# Patient Record
Sex: FEMALE | Race: WHITE
[De-identification: names, ages, dates, MRNs, and addresses within clinical notes are randomized per-mention and may not be internally consistent; named-entity substitution may affect disease eponyms.]

---

## 2019-02-14 ENCOUNTER — HOSPITAL ENCOUNTER (OUTPATIENT)
Dept: HOSPITAL 11 - JP.SDS | Age: 39
End: 2019-02-14
Attending: SURGERY
Payer: COMMERCIAL

## 2019-02-14 DIAGNOSIS — R59.0: Primary | ICD-10-CM

## 2019-02-14 DIAGNOSIS — J45.909: ICD-10-CM

## 2019-02-14 DIAGNOSIS — E66.9: ICD-10-CM

## 2019-02-14 DIAGNOSIS — Z88.0: ICD-10-CM

## 2019-02-19 NOTE — OR
DATE OF PROCEDURE:  02/14/2019

 

PREOPERATIVE DIAGNOSIS:  Probable lymphadenopathy, right inguinal area.

 

POSTOPERATIVE DIAGNOSIS:  Subfascial mass, right inguinal area.

 

OPERATIVE PROCEDURE:  Excision of subfascial mass, right inguinal area (38010).

 

ANESTHESIA:  Local plus IV sedation.

 

SURGEON:  Paulo Singh MD

 

ASSISTANT:  ABAD Shi.

 

INDICATION FOR PROCEDURE:  This is a 38-year-old status post previous excision of melanoma

from her right lower back, presenting with a mass effect in the right inguinal area.  This

clinically appears to be most likely lymph node.  It is somewhat painful and obviously

suspicious for potential site of metastatic disease in that area.  The size of the lesion is

roughly the size of a peanut and, given this, a direct excisional biopsy appears to be most

appropriate.  Should this come back as a metastatic melanoma, we would then stage the

patient with a CT scan of the chest, abdomen and pelvis, and a PET scan, and if no disease

outside of the inguinal and/or iliac areas on the right side is identified, full lymph node

dissection will subsequently be performed.  Frozen section will not be obtained, as present

standard of care within the pathologic community is not to do frozen sections on potentially

metastatic melanomas or lymph nodes, due to some occasional false-positive reports.

Potential risks otherwise including bleeding and infection were reviewed, and the patient

wishes to proceed.

 

DETAILS OF PROCEDURE:  The patient was taken to the operating room and placed in the supine

position. The area of concern had been marked on the skin level in the preoperative area.

The lower abdomen and groin areas were then prepped and draped.  After IV sedation was

administered, anesthetized with 1% lidocaine mixed with Marcaine, a small incision over the

palpable mass was then made and carried down through the skin and subcutaneous tissue.  The

mass appeared to be below the level of Radha's fascia, and this was incised.  The mass was

then excised with a small rim of normal-appearing fat around it.  Again, the lesion itself,

upon its removal, was around 2 cm including the lesion and margin dimensions.  The incision

was then closed with some 3-0 and 4-0 Vicryl stitch deep and a 5-0 Vicryl subcuticular

stitch.  Dressing was applied.  The patient was taken to the recovery room in a satisfactory

condition.

 

Of note, off the field, at the end of the procedure, the lesion was bisected.  This did not

appear to have any pigment within it and maybe something other than metastatic disease, i.e.

could be some sort of fat necrosis in that area.  We will await the pathologic findings and

not proceed with any further staging at this point, awaiting the pathology on this resected

specimen.

 

 

 

 

Paulo Singh MD

DD:  02/18/2019 18:56:30

DT:  02/19/2019 12:15:42

Job #:  [id number]

## 2019-03-31 ENCOUNTER — HOSPITAL ENCOUNTER (EMERGENCY)
Dept: HOSPITAL 11 - JP.ED | Age: 39
Discharge: HOME | End: 2019-03-31
Payer: COMMERCIAL

## 2019-03-31 DIAGNOSIS — E05.90: ICD-10-CM

## 2019-03-31 DIAGNOSIS — K52.9: Primary | ICD-10-CM

## 2019-03-31 DIAGNOSIS — F41.9: ICD-10-CM

## 2019-03-31 DIAGNOSIS — Z88.0: ICD-10-CM

## 2019-03-31 DIAGNOSIS — G44.209: ICD-10-CM

## 2019-03-31 DIAGNOSIS — J45.909: ICD-10-CM

## 2019-03-31 PROCEDURE — 80053 COMPREHEN METABOLIC PANEL: CPT

## 2019-03-31 PROCEDURE — 96375 TX/PRO/DX INJ NEW DRUG ADDON: CPT

## 2019-03-31 PROCEDURE — 96361 HYDRATE IV INFUSION ADD-ON: CPT

## 2019-03-31 PROCEDURE — 83690 ASSAY OF LIPASE: CPT

## 2019-03-31 PROCEDURE — 36415 COLL VENOUS BLD VENIPUNCTURE: CPT

## 2019-03-31 PROCEDURE — 85025 COMPLETE CBC W/AUTO DIFF WBC: CPT

## 2019-03-31 PROCEDURE — 96374 THER/PROPH/DIAG INJ IV PUSH: CPT

## 2019-03-31 PROCEDURE — 99284 EMERGENCY DEPT VISIT MOD MDM: CPT

## 2019-03-31 NOTE — EDM.PDOC
ED HPI GENERAL MEDICAL PROBLEM





- General


Chief Complaint: Gastrointestinal Problem


Stated Complaint: VOMITING, NECK PAIN, HEADACHE


Time Seen by Provider: 19 18:45


Source of Information: Reports: Patient


History Limitations: Reports: No Limitations





- History of Present Illness


INITIAL COMMENTS - FREE TEXT/NARRATIVE: 





38-year-old female who has had 2 sons with gastroenteritis over the past week, 

nausea vomiting and diarrhea. She developed symptoms 3 days ago but they're 

very persistent, she think she's had intermittent fevers and has a persistent 

global headache and neck discomfort. She's been taking ibuprofen and 

acetaminophen "around the clock". Some abdominal cramping and pain in the 

epigastric area and intermittently in the left lower quadrant. No cough or 

shortness of breath, no cold symptoms. She is not seeing any blood in the 

emesis or diarrhea, she has not been traveling overseas.


Onset: Sudden (Symptoms develop fairly suddenly 3 days ago)


Associated Symptoms: Reports: Fever/Chills, Headaches, Malaise, Nausea/Vomiting

, Weakness.  Denies: Confusion, Chest Pain, Cough, Diaphoresis


  ** Headache


Pain Score (Numeric/FACES): 8





- Related Data


 Allergies











Allergy/AdvReac Type Severity Reaction Status Date / Time


 


Penicillins Allergy  Hives Verified 19 18:38











Home Meds: 


 Home Meds





Albuterol Sulfate [Proair Hfa] 2 puff IH Q6H PRN 19 [History]


Sertraline [Zoloft] 100 mg PO DAILY 19 [History]


Spironolactone 50 mg PO DAILY 19 [History]











Past Medical History


HEENT History: Reports: Impaired Vision


Respiratory History: Reports: Asthma


OB/GYN History: Reports: Pregnancy


Psychiatric History: Reports: Anxiety


Endocrine/Metabolic History: Reports: Hyperthyroidism


Oncologic (Cancer) History: Reports: Other (See Below)


Other Oncologic History: MELANOMA


Dermatologic History: Reports: Eczema, Melanoma, Psoriasis





- Infectious Disease History


Infectious Disease History: Reports: Chicken Pox





- Past Surgical History


Female  Surgical History: Reports:  Section


Dermatological Surgical History: Reports: Skin Biopsy, Other (See Below)





Social & Family History





- Family History


Family Medical History: Noncontributory





- Tobacco Use


Smoking Status *Q: Never Smoker


Second Hand Smoke Exposure: No





- Caffeine Use


Caffeine Use: Reports: Tea





- Alcohol Use


Days Per Week of Alcohol Use: 0





- Recreational Drug Use


Recreational Drug Use: No





ED ROS GENERAL





- Review of Systems


Review Of Systems: See Below


Constitutional: Reports: Fever, Chills, Malaise, Decreased Appetite


HEENT: Denies: Vision Change


Respiratory: Denies: Shortness of Breath


Cardiovascular: Denies: Chest Pain


GI/Abdominal: Reports: Abdominal Pain, Diarrhea, Nausea, Vomiting.  Denies: 

Hematemesis, Hematochezia


: Reports: No Symptoms


Skin: Reports: No Symptoms


Neurological: Reports: Headache.  Denies: Dizziness


Psychiatric: Reports: No Symptoms





ED EXAM, GI/ABD





- Physical Exam


Exam: See Below


Exam Limited By: No Limitations


General Appearance: Alert, No Apparent Distress (Patient is not distressed but 

does look significantly uncomfortable)


Eyes: Bilateral: Normal Appearance (No jaundice)


Respiratory/Chest: No Respiratory Distress, Lungs Clear


Cardiovascular: Regular Rate, Rhythm.  No: Tachycardia


GI/Abdominal Exam: Soft, Tender (Patient is tender to palpation in the left 

lower quadrant without guarding or rebound), Abnormal Bowel Sounds (Pulses are 

very hypoactive)


Extremities: No: Pedal Edema


Neurological: Alert, Oriented, No Motor/Sensory Deficits


Psychiatric: Flat Affect


Skin Exam: Warm, Dry





Course





- Vital Signs


Last Recorded V/S: 


 Last Vital Signs











Temp  97.3 F   19 18:45


 


Pulse  68   19 20:46


 


Resp  16   19 20:46


 


BP  103/57 L  19 20:46


 


Pulse Ox  98   19 20:46














- Orders/Labs/Meds


Labs: 


 Laboratory Tests











  19 Range/Units





  19:00 19:00 


 


WBC  3.0 L   (4.5-11.0)  K/uL


 


RBC  4.32   (3.30-5.50)  M/uL


 


Hgb  12.3   (12.0-15.0)  g/dL


 


Hct  36.7   (36.0-48.0)  %


 


MCV  85   (80-98)  fL


 


MCH  29   (27-31)  pg


 


MCHC  34   (32-36)  %


 


Plt Count  145 L   (150-400)  K/uL


 


Neut % (Auto)  46   (36-66)  %


 


Lymph % (Auto)  35   (24-44)  %


 


Mono % (Auto)  16 H   (2-6)  %


 


Eos % (Auto)  2   (2-4)  %


 


Baso % (Auto)  1   (0-1)  %


 


Sodium   138 L  (140-148)  mmol/L


 


Potassium   3.3 L  (3.6-5.2)  mmol/L


 


Chloride   104  (100-108)  mmol/L


 


Carbon Dioxide   25  (21-32)  mmol/L


 


Anion Gap   12.3  (5.0-14.0)  mmol/L


 


BUN   8  (7-18)  mg/dL


 


Creatinine   0.7  (0.6-1.0)  mg/dL


 


Est Cr Clr Drug Dosing   104.01  mL/min


 


Estimated GFR (MDRD)   > 60  (>60)  


 


Glucose   111 H  ()  mg/dL


 


Calcium   8.6  (8.5-10.1)  mg/dL


 


Total Bilirubin   0.5  (0.2-1.0)  mg/dL


 


AST   23  (15-37)  U/L


 


ALT   33  (12-78)  U/L


 


Alkaline Phosphatase   60  ()  U/L


 


Total Protein   7.1  (6.4-8.2)  g/dL


 


Albumin   3.3 L  (3.4-5.0)  g/dL


 


Globulin   3.8 H  (2.3-3.5)  g/dL


 


Albumin/Globulin Ratio   0.9 L  (1.2-2.2)  


 


Lipase   81  ()  U/L











Meds: 


Medications














Discontinued Medications














Generic Name Dose Route Start Last Admin





  Trade Name Freq  PRN Reason Stop Dose Admin


 


Hydromorphone HCl  0.5 mg  19 19:42  19 20:16





  Dilaudid  IVPUSH  19 19:43  0.5 mg





  ONETIME ONE   Administration





     





     





     





     


 


Sodium Chloride  1,000 mls @ 1,000 mls/hr  19 19:00  19 19:13





  Normal Saline  IV   1,000 mls/hr





  ASDIRECTED GLENNY   Administration





     





     





     





     


 


Ketorolac Tromethamine  30 mg  19 19:00  19 19:13





  Toradol  IVPUSH  19 19:01  30 mg





  ONETIME ONE   Administration





     





     





     





     


 


Ondansetron HCl  4 mg  19 19:00  19 19:13





  Zofran  IVPUSH  19 19:01  4 mg





  ONETIME ONE   Administration





     





     





     





     














- Re-Assessments/Exams


Free Text/Narrative Re-Assessment/Exam: 





19 19:05


CBC, CMP and lipase were obtained, we will also obtain a stool sample if 

possible. Patient will be given 1 L of normal saline along with 4 mg of Zofran 

and 30 mg of Toradol, followed by IV Dilaudid if necessary.


19 20:11


CBC returned with white count of only 3000. Patient had only mild relief with 

the IV Toradol, so was given 0.5 mg of IV Dilaudid. After another hour the 

patient felt markedly better, was able to move her head freely with little 

discomfort. Her chemistry profile was normal other than a potassium of 3.3. 

Lipase is normal. Over the course of 2 hours the patient was unable to give us 

a stool sample and had no additional vomiting. She was asked to give this just 

1 or 2 more days and advance diet slowly, return if worsening.





Departure





- Departure


Time of Disposition: 20:58


Disposition: Home, Self-Care 01


Condition: Fair


Clinical Impression: 


 Gastroenteritis





Headache


Qualifiers:


 Headache type: tension-type Headache chronicity pattern: acute headache 

Intractability: not intractable Qualified Code(s): G44.209 - Tension-type 

headache, unspecified, not intractable








- Discharge Information


Instructions:  Viral Gastroenteritis, Adult, Easy-to-Read


Referrals: 


Eveline Villa RN [Primary Care Provider] - 


Forms:  ED Department Discharge


Care Plan Goals: 


Rest, maintain fluid intake, and increase diet and activity as tolerated. 

Consider rechecking in 2-3 days if not improving satisfactorily, or return 

anytime sooner if worsening.

## 2022-07-11 ENCOUNTER — HOSPITAL ENCOUNTER (EMERGENCY)
Dept: HOSPITAL 11 - JP.ED | Age: 42
Discharge: HOME | End: 2022-07-11
Payer: COMMERCIAL

## 2022-07-11 DIAGNOSIS — Z87.891: ICD-10-CM

## 2022-07-11 DIAGNOSIS — G43.909: Primary | ICD-10-CM

## 2022-07-11 DIAGNOSIS — G44.209: ICD-10-CM

## 2022-07-11 DIAGNOSIS — Z88.0: ICD-10-CM

## 2022-07-11 DIAGNOSIS — E03.9: ICD-10-CM

## 2022-07-11 DIAGNOSIS — Z79.899: ICD-10-CM

## 2022-07-11 PROCEDURE — 96361 HYDRATE IV INFUSION ADD-ON: CPT

## 2022-07-11 PROCEDURE — 96375 TX/PRO/DX INJ NEW DRUG ADDON: CPT

## 2022-07-11 PROCEDURE — 96374 THER/PROPH/DIAG INJ IV PUSH: CPT

## 2022-07-11 PROCEDURE — 96372 THER/PROPH/DIAG INJ SC/IM: CPT

## 2022-07-11 PROCEDURE — 99283 EMERGENCY DEPT VISIT LOW MDM: CPT

## 2025-06-24 ENCOUNTER — HOSPITAL ENCOUNTER (EMERGENCY)
Dept: HOSPITAL 11 - JP.ED | Age: 45
Discharge: HOME | End: 2025-06-24
Payer: COMMERCIAL

## 2025-06-24 DIAGNOSIS — Z79.890: ICD-10-CM

## 2025-06-24 DIAGNOSIS — J45.909: ICD-10-CM

## 2025-06-24 DIAGNOSIS — Z79.51: ICD-10-CM

## 2025-06-24 DIAGNOSIS — Z87.891: ICD-10-CM

## 2025-06-24 DIAGNOSIS — E03.9: ICD-10-CM

## 2025-06-24 DIAGNOSIS — K04.7: Primary | ICD-10-CM

## 2025-06-24 DIAGNOSIS — Z88.0: ICD-10-CM

## 2025-06-24 DIAGNOSIS — Z79.899: ICD-10-CM

## 2025-06-24 DIAGNOSIS — Z86.16: ICD-10-CM

## 2025-06-24 PROCEDURE — 99283 EMERGENCY DEPT VISIT LOW MDM: CPT

## 2025-06-24 PROCEDURE — 96365 THER/PROPH/DIAG IV INF INIT: CPT

## 2025-06-24 PROCEDURE — 96375 TX/PRO/DX INJ NEW DRUG ADDON: CPT

## 2025-06-24 RX ADMIN — FENTANYL CITRATE ONE MCG: 50 INJECTION, SOLUTION INTRAMUSCULAR; INTRAVENOUS at 21:06

## 2025-06-24 RX ADMIN — CLINDAMYCIN PHOSPHATE ONE MLS/HR: 600 INJECTION, SOLUTION INTRAVENOUS at 21:20

## 2025-06-24 RX ADMIN — KETOROLAC TROMETHAMINE ONE MG: 30 INJECTION, SOLUTION INTRAMUSCULAR at 21:20

## 2025-06-24 RX ADMIN — DEXTROSE ONE MG: 50 INJECTION, SOLUTION INTRAVENOUS at 21:20
